# Patient Record
Sex: FEMALE | Race: WHITE
[De-identification: names, ages, dates, MRNs, and addresses within clinical notes are randomized per-mention and may not be internally consistent; named-entity substitution may affect disease eponyms.]

---

## 2018-01-06 ENCOUNTER — HOSPITAL ENCOUNTER (EMERGENCY)
Dept: HOSPITAL 57 - BURERS | Age: 33
Discharge: HOME | End: 2018-01-06
Payer: COMMERCIAL

## 2018-01-06 DIAGNOSIS — O99.612: Primary | ICD-10-CM

## 2018-01-06 DIAGNOSIS — Z3A.27: ICD-10-CM

## 2018-01-06 DIAGNOSIS — F17.210: ICD-10-CM

## 2018-01-06 DIAGNOSIS — O16.2: ICD-10-CM

## 2018-01-06 DIAGNOSIS — F41.9: ICD-10-CM

## 2018-01-06 DIAGNOSIS — F32.9: ICD-10-CM

## 2018-01-06 DIAGNOSIS — A08.4: ICD-10-CM

## 2018-01-06 DIAGNOSIS — O99.332: ICD-10-CM

## 2018-01-06 DIAGNOSIS — O99.342: ICD-10-CM

## 2018-01-06 LAB
ALBUMIN SERPL BCG-MCNC: 3.6 G/DL (ref 3.5–5)
ALP SERPL-CCNC: 137 U/L (ref 40–150)
ALT SERPL W P-5'-P-CCNC: 7 U/L (ref 8–55)
ANION GAP SERPL CALC-SCNC: 17 MMOL/L (ref 10–20)
AST SERPL-CCNC: 14 U/L (ref 5–34)
BACTERIA UR QL AUTO: (no result) HPF
BASOPHILS # BLD AUTO: 0 THOU/UL (ref 0–0.2)
BASOPHILS NFR BLD AUTO: 0.3 % (ref 0–1)
BILIRUB SERPL-MCNC: 0.4 MG/DL (ref 0.2–1.2)
BUN SERPL-MCNC: 9 MG/DL (ref 7–18.7)
CALCIUM SERPL-MCNC: 8.5 MG/DL (ref 7.8–10.44)
CHLORIDE SERPL-SCNC: 105 MMOL/L (ref 98–107)
CO2 SERPL-SCNC: 20 MMOL/L (ref 22–29)
CREAT CL PREDICTED SERPL C-G-VRATE: 0 ML/MIN (ref 70–130)
EOSINOPHIL # BLD AUTO: 0 THOU/UL (ref 0–0.7)
EOSINOPHIL NFR BLD AUTO: 0.2 % (ref 0–10)
GLOBULIN SER CALC-MCNC: 3.2 G/DL (ref 2.4–3.5)
GLUCOSE SERPL-MCNC: 119 MG/DL (ref 70–105)
HGB BLD-MCNC: 11.5 G/DL (ref 12–16)
LIPASE SERPL-CCNC: 18 U/L (ref 8–78)
LYMPHOCYTES # BLD AUTO: 0.8 THOU/UL (ref 1.2–3.4)
LYMPHOCYTES NFR BLD AUTO: 5.7 % (ref 21–51)
MCH RBC QN AUTO: 30.7 PG (ref 27–31)
MCV RBC AUTO: 90.6 FL (ref 81–99)
MONOCYTES # BLD AUTO: 0.5 THOU/UL (ref 0.11–0.59)
MONOCYTES NFR BLD AUTO: 3.5 % (ref 0–10)
NEUTROPHILS # BLD AUTO: 13.1 THOU/UL (ref 1.4–6.5)
NEUTROPHILS NFR BLD AUTO: 90.4 % (ref 42–75)
PLATELET # BLD AUTO: 319 THOU/UL (ref 130–400)
POTASSIUM SERPL-SCNC: 3.8 MMOL/L (ref 3.5–5.1)
PROT UR STRIP.AUTO-MCNC: 30 MG/DL
RBC # BLD AUTO: 3.75 MILL/UL (ref 4.2–5.4)
RBC UR QL AUTO: (no result) HPF (ref 0–3)
SODIUM SERPL-SCNC: 138 MMOL/L (ref 136–145)
SP GR UR STRIP: 1.02 (ref 1–1.03)
WBC # BLD AUTO: 14.5 THOU/UL (ref 4.8–10.8)
WBC UR QL AUTO: (no result) HPF (ref 0–3)

## 2018-01-06 PROCEDURE — 96361 HYDRATE IV INFUSION ADD-ON: CPT

## 2018-01-06 PROCEDURE — 80053 COMPREHEN METABOLIC PANEL: CPT

## 2018-01-06 PROCEDURE — 83690 ASSAY OF LIPASE: CPT

## 2018-01-06 PROCEDURE — 81015 MICROSCOPIC EXAM OF URINE: CPT

## 2018-01-06 PROCEDURE — 96374 THER/PROPH/DIAG INJ IV PUSH: CPT

## 2018-01-06 PROCEDURE — 81003 URINALYSIS AUTO W/O SCOPE: CPT

## 2018-01-06 PROCEDURE — 85025 COMPLETE CBC W/AUTO DIFF WBC: CPT

## 2018-01-06 PROCEDURE — 94760 N-INVAS EAR/PLS OXIMETRY 1: CPT

## 2018-04-07 ENCOUNTER — HOSPITAL ENCOUNTER (INPATIENT)
Dept: HOSPITAL 92 - ERS | Age: 33
LOS: 3 days | Discharge: HOME | DRG: 776 | End: 2018-04-10
Attending: INTERNAL MEDICINE | Admitting: INTERNAL MEDICINE
Payer: COMMERCIAL

## 2018-04-07 VITALS — BODY MASS INDEX: 21.7 KG/M2

## 2018-04-07 DIAGNOSIS — O99.89: ICD-10-CM

## 2018-04-07 DIAGNOSIS — F41.8: ICD-10-CM

## 2018-04-07 DIAGNOSIS — Z82.49: ICD-10-CM

## 2018-04-07 DIAGNOSIS — E87.6: ICD-10-CM

## 2018-04-07 DIAGNOSIS — I50.21: ICD-10-CM

## 2018-04-07 DIAGNOSIS — Z79.899: ICD-10-CM

## 2018-04-07 DIAGNOSIS — I24.8: ICD-10-CM

## 2018-04-07 DIAGNOSIS — F17.210: ICD-10-CM

## 2018-04-07 DIAGNOSIS — D64.9: ICD-10-CM

## 2018-04-07 DIAGNOSIS — I34.1: ICD-10-CM

## 2018-04-07 LAB
ALBUMIN SERPL BCG-MCNC: 3.7 G/DL (ref 3.5–5)
ALP SERPL-CCNC: 108 U/L (ref 40–150)
ALT SERPL W P-5'-P-CCNC: 10 U/L (ref 8–55)
ANION GAP SERPL CALC-SCNC: 13 MMOL/L (ref 10–20)
AST SERPL-CCNC: 18 U/L (ref 5–34)
BASOPHILS # BLD AUTO: 0.1 THOU/UL (ref 0–0.2)
BASOPHILS NFR BLD AUTO: 0.6 % (ref 0–1)
BILIRUB SERPL-MCNC: 0.5 MG/DL (ref 0.2–1.2)
BUN SERPL-MCNC: 15 MG/DL (ref 7–18.7)
CALCIUM SERPL-MCNC: 8.5 MG/DL (ref 7.8–10.44)
CHLORIDE SERPL-SCNC: 107 MMOL/L (ref 98–107)
CK MB SERPL-MCNC: 2.2 NG/ML (ref 0–6.6)
CO2 SERPL-SCNC: 20 MMOL/L (ref 22–29)
CREAT CL PREDICTED SERPL C-G-VRATE: 0 ML/MIN (ref 70–130)
CRYSTAL-AUWI FLAG: 0.1 (ref 0–15)
EOSINOPHIL # BLD AUTO: 0 THOU/UL (ref 0–0.7)
EOSINOPHIL NFR BLD AUTO: 0.2 % (ref 0–10)
GLOBULIN SER CALC-MCNC: 3 G/DL (ref 2.4–3.5)
GLUCOSE SERPL-MCNC: 106 MG/DL (ref 70–105)
HEV IGM SER QL: 1.3 (ref 0–7.99)
HGB BLD-MCNC: 11 G/DL (ref 12–16)
HYALINE CASTS #/AREA URNS LPF: (no result) LPF
LYMPHOCYTES # BLD: 2.1 THOU/UL (ref 1.2–3.4)
LYMPHOCYTES NFR BLD AUTO: 25.1 % (ref 21–51)
MCH RBC QN AUTO: 30.9 PG (ref 27–31)
MCV RBC AUTO: 89.5 FL (ref 81–99)
MONOCYTES # BLD AUTO: 0.5 THOU/UL (ref 0.11–0.59)
MONOCYTES NFR BLD AUTO: 5.6 % (ref 0–10)
NEUTROPHILS # BLD AUTO: 5.8 THOU/UL (ref 1.4–6.5)
NEUTROPHILS NFR BLD AUTO: 68.6 % (ref 42–75)
PATHC CAST-AUWI FLAG: 0 (ref 0–2.49)
PLATELET # BLD AUTO: 346 THOU/UL (ref 130–400)
POTASSIUM SERPL-SCNC: 3.2 MMOL/L (ref 3.5–5.1)
PREGS CONTROL BACKGROUND?: (no result)
PREGS CONTROL BAR APPEAR?: YES
RBC # BLD AUTO: 3.57 MILL/UL (ref 4.2–5.4)
RBC UR QL AUTO: (no result) HPF (ref 0–3)
SODIUM SERPL-SCNC: 137 MMOL/L (ref 136–145)
SP GR UR STRIP: 1.05 (ref 1–1.04)
SPERM-AUWI FLAG: 0 (ref 0–9.9)
TROPONIN I SERPL DL<=0.01 NG/ML-MCNC: 0.03 NG/ML (ref ?–0.03)
TROPONIN I SERPL DL<=0.01 NG/ML-MCNC: 0.04 NG/ML (ref ?–0.03)
TROPONIN I SERPL DL<=0.01 NG/ML-MCNC: 0.04 NG/ML (ref ?–0.03)
WBC # BLD AUTO: 8.4 THOU/UL (ref 4.8–10.8)
WBC UR QL AUTO: (no result) HPF (ref 0–3)
YEAST-AUWI FLAG: 0 (ref 0–25)

## 2018-04-07 PROCEDURE — 82553 CREATINE MB FRACTION: CPT

## 2018-04-07 PROCEDURE — 36415 COLL VENOUS BLD VENIPUNCTURE: CPT

## 2018-04-07 PROCEDURE — A4216 STERILE WATER/SALINE, 10 ML: HCPCS

## 2018-04-07 PROCEDURE — 87040 BLOOD CULTURE FOR BACTERIA: CPT

## 2018-04-07 PROCEDURE — 81003 URINALYSIS AUTO W/O SCOPE: CPT

## 2018-04-07 PROCEDURE — 84550 ASSAY OF BLOOD/URIC ACID: CPT

## 2018-04-07 PROCEDURE — 85379 FIBRIN DEGRADATION QUANT: CPT

## 2018-04-07 PROCEDURE — 93306 TTE W/DOPPLER COMPLETE: CPT

## 2018-04-07 PROCEDURE — 71275 CT ANGIOGRAPHY CHEST: CPT

## 2018-04-07 PROCEDURE — 96372 THER/PROPH/DIAG INJ SC/IM: CPT

## 2018-04-07 PROCEDURE — 86140 C-REACTIVE PROTEIN: CPT

## 2018-04-07 PROCEDURE — 96361 HYDRATE IV INFUSION ADD-ON: CPT

## 2018-04-07 PROCEDURE — 93798 PHYS/QHP OP CAR RHAB W/ECG: CPT

## 2018-04-07 PROCEDURE — 84703 CHORIONIC GONADOTROPIN ASSAY: CPT

## 2018-04-07 PROCEDURE — 83735 ASSAY OF MAGNESIUM: CPT

## 2018-04-07 PROCEDURE — 93005 ELECTROCARDIOGRAM TRACING: CPT

## 2018-04-07 PROCEDURE — 71045 X-RAY EXAM CHEST 1 VIEW: CPT

## 2018-04-07 PROCEDURE — 84484 ASSAY OF TROPONIN QUANT: CPT

## 2018-04-07 PROCEDURE — 85652 RBC SED RATE AUTOMATED: CPT

## 2018-04-07 PROCEDURE — 96365 THER/PROPH/DIAG IV INF INIT: CPT

## 2018-04-07 PROCEDURE — 83880 ASSAY OF NATRIURETIC PEPTIDE: CPT

## 2018-04-07 PROCEDURE — 80053 COMPREHEN METABOLIC PANEL: CPT

## 2018-04-07 PROCEDURE — 96367 TX/PROPH/DG ADDL SEQ IV INF: CPT

## 2018-04-07 PROCEDURE — 96376 TX/PRO/DX INJ SAME DRUG ADON: CPT

## 2018-04-07 PROCEDURE — 81015 MICROSCOPIC EXAM OF URINE: CPT

## 2018-04-07 PROCEDURE — 85025 COMPLETE CBC W/AUTO DIFF WBC: CPT

## 2018-04-07 NOTE — HP
PRIMARY CARE PHYSICIAN:  City call admission.

 

REASON FOR ADMISSION:  Increasing shortness of breath, hypoxia, tachycardia (new onset postpartum car
diomyopathy, suspected pneumonia).

 

HISTORY OF PRESENT ILLNESS:  A 32-year-old female, who has past medical history of depression.  She h
ad normal vaginal delivery on 03/19/2018.  Subsequently, the patient started feeling increasing short
ness of breath.  She was getting out of breath after walking a few distances.  She started also feeli
ng orthopnea and she was waking up with shortness of breath for the last 2 to 3 days.  Her condition 
was kept getting worse.  She was having cough productive of scant amount of sputum.  She denies any l
ower extremity edema.  She denies any calf tenderness.  She denies any chest pain or pleurisy.  She d
enies any fever or chills.  She denies any flu-like illness.  She denies any sick exposure or recent 
travel.

 

She denies any syncopal episode.  She denies any UTI symptoms.  She denies any constipation, diarrhea
, melena or hematochezia.

 

In the emergency room, this patient was tachycardic and tachypneic as well as mildly hypoxic.  She wa
s hypertensive.  She had a chest x-ray in the emergency room, which showed pulmonary vascular congest
ion and multifocal patchy infiltration.  CT angio was performed, which showed no evidence of pulmonar
y embolism, but patchy ground glass opacity throughout the lung parenchyma.  The patient was given va
ncomycin and Zosyn in the emergency room for suspected pneumonia.  The patient had potassium low and 
potassium chloride was also given orally and she was given some IV fluid.  When I saw this patient, a
t that time, the patient was having tachycardia.  She was maintaining saturation with nasal cannula a
nd she was vitals wise stable.  Her BNP is also elevated.  She is afebrile.  At that point, we decide
d to change her status from IMCU admission to telemetry floor.

 

ALLERGIES:  No known drug allergy.

 

CURRENT HOME MEDICATIONS:  Prenatal vitamin 1 tablet p.o. daily, Zoloft 25 mg p.o. daily, Motrin 800 
mg q.8 hourly. p.r.n., and Tylenol #3 one tablet q.4 hourly p.r.n.

 

REVIEW OF SYSTEMS:  The following complete review of systems was negative, unless otherwise mentioned
 in the HPI or below:

Constitutional:  Weight loss or gain, ability to conduct usual activities.

Skin:  Rash, itching.

Eyes:  Double vision, pain.

ENT/Mouth:  Nose bleeding, neck stiffness, pain, tenderness.

Cardiovascular:  Palpitations, dyspnea on exertion, orthopnea.

Respiratory:  Shortness of breath, wheezing, cough, hemoptysis, fever or night sweats.

Gastrointestinal:  Poor appetite, abdominal pain, heartburn, nausea, vomiting, constipation, or diarr
hea.

Genitourinary:  Urgency, frequency, dysuria, nocturia.

Musculoskeletal:  Pain, swelling.

Neurologic/Psychiatric:  Anxiety, depression.

Allergy/Immunologic:  Skin rash, bleeding tendency.

 

Please see my HPI for pertinent positive and negative.  All other review of systems reviewed and nega
tive except as mentioned in the HPI.

 

PAST MEDICAL HISTORY:  Mitral valve prolapse, hypertension and borderline diabetes during pregnancy.

 

PAST SURGICAL HISTORY:  Reviewed and negative.

 

PAST PSYCHIATRIC HISTORY:  Anxiety and depression.

 

SOCIAL HISTORY:  The patient is .  She lives at home with her .  She smokes about half 
pack per day.  She denies any alcohol or other illicit drug abuse.

 

OBSTETRICAL/GYNECOLOGICAL HISTORY:  The patient had total five pregnancies.  She has total of 3 kids 
alive and 2 miscarriages.

 

FAMILY HISTORY:  No strong family history of premature coronary artery disease, stroke or cancer.

 

EMERGENCY ROOM COURSE:  The patient has received vancomycin, Zosyn, IV fluid, and potassium chloride.


 

PHYSICAL EXAMINATION:

VITAL SIGNS:  On arrival, blood pressure 120/104, pulse 118, respiratory rate 24, temperature 99.6, s
aturation 95% on 3 liter oxygen.  Weight 58 kilograms.

GENERAL:  The patient is currently alert, awake, tachypneic, tachycardic, hypertensive, no obvious ac
[place] distress.

HEENT:  Head:  Normocephalic, atraumatic.  Eyes:  Pupils round, reactive to light.  Extraocular muscl
es are intact.  ENT:  Oropharynx within normal limits.  Moist mucous membranes.  No oral lesion, no p
haryngeal erythema, no exudate.

NECK:  Supple, no JVD, no thyromegaly, no carotid bruit, no jugular venous distention.

LUNGS:  Bibasilar rales noted, coarse breath sounds.  No wheezing, no rhonchi.

CARDIAC:  S1, S2 regular, tachycardia, soft systolic murmur present.

ABDOMEN:  Soft, bowel sounds present, nontender, nondistended.  No organomegaly, no mass, no suprapub
ic tenderness.

BACK:  Unremarkable, no CVA tenderness.

EXTREMITIES:  Upper extremity:  Passive movements of all joints are normal.  Lower extremities:  No e
мария, no calf tenderness.  Good distal pulsation.

SKIN:  No skin rash.

HEMATOLOGIC:  No lymphadenopathy.

PSYCHIATRIC:  Normal affect.

 

SIGNIFICANT LABS:  EKG showing sinus tachycardia, nonspecific ST-T changes in inferolateral lead.  Ch
est x-ray showing pulmonary vascular congestion, multifocal infiltration.  CT angio showing no eviden
ce of PE, ground glass opacity throughout the lung parenchyma.

 

CBC:  WBC 8.4, hemoglobin 11.0, platelets 346, ESR is 41.  D-dimer is 0.86.

 

BMP:  Sodium 137, potassium 3.2, chloride 107, carbon dioxide 20, BUN 15, creatinine 0.82, glucose 10
6, calcium 8.5.

 

LFT:  AST 18, ALT 10, alkaline phosphatase 108, albumin 3.7, CK-MB 2.2.  Troponin I is 0.038, then 0.
040.  CRP is 2.35.  BNP is 1642, magnesium 1.7.  Urinalysis is unremarkable.

 

ASSESSMENT AND PLAN:

1.  New onset postpartum cardiomyopathy.  At this point, this patient has a classic history of dyspne
a on exertion, orthopnea, paroxysmal nocturnal dyspnea, as well as elevated BNP.  The patient's chest
 x-ray also showing pulmonary vascular congestion and CT chest also supports pulmonary vascular conge
stion.  She has elevated BNP and based on appropriate clinical scenario, we are suspecting postpartum
 cardiomyopathy.  We will obtain echocardiography to assess ejection fraction and other structural ab
normality.  This patient will be treated with Lasix 20 mg IV b.i.d.  We will monitor weight, renal fu
nction and electrolytes, and replace accordingly.  Depending upon echocardiographic finding, we will 
consider Cardiology evaluation.  We will monitor on telemetry floor.

2.  Elevated troponin, likely due to demand ischemia.  We will do total 3 sets of cardiac enzymes.  W
e will continue aspirin 81 mg p.o. daily.  Echocardiography will be done.  We will check lipid profil
e tomorrow morning for risk stratification.

3.  Hypokalemia.  Magnesium checked and it is normal.  We will replace potassium chloride 20 mEq p.o.
 daily and replace more if needed.

4.  Anemia, normocytic, normochromic.  We will continue prenatal vitamin daily while in the hospital.


5.  Anxiety and depression.  Continue Zoloft 50 mg p.o. daily.

6.  Elevated D-dimer, but negative CT angio.

7.  Suspected pneumonia.  We will continue Zosyn 3.375 grams IV q.6 hourly.

8.  Deep venous thrombosis prophylaxis, Lovenox 40 mg subcu daily.

9.  Gastrointestinal prophylaxis, Pepcid 20 mg p.o. b.i.d.

9.  Code status:  The patient is FULL CODE.  The patient's  is surrogate decision maker.

10.  Tobacco abuse disorder.  Smoking cessation counseling given.  We will offer nicotine patch if ne
eded.

 

Disposition plan based on clinical course.  We are expecting the patient's stay in hospital more than
 2 midnights.  Plan of care discussed with the patient in detail.

## 2018-04-07 NOTE — RAD
CHEST 1 VIEW:

 

Date:  04/07/18

 

HISTORY:  

Chest pain. 

 

FINDINGS:

Cardiac silhouette is magnified and upper limits of normal in size. Pulmonary vasculature is engorged
 with multifocal ill-defined patchy infiltrates throughout each lung, most pronounced at the right guilherme
ng base. Small amount of right pleural fluid is apparent. Mediastinum is midline. No evidence of pneu
mothorax. 

 

IMPRESSION: 

Borderline pulmonary vascular congestion with multifocal patchy infiltrates. Please see separate repo
rt regarding CT chest performed on the same date. 

 

 

POS: [facility]

## 2018-04-07 NOTE — PDOC.CTH
Cardiology Progress Note





- Objective


 Vital Signs











  Temp Pulse Resp BP Pulse Ox


 


 04/07/18 16:36  98.6 F  75  16  130/91 H  94 L


 


 04/07/18 12:16  97.8 F  61  20   96


 


 04/07/18 12:00  97.8 F  122 H  20  129/87  96








 











Weight                         114 lb 10.246 oz














 











 04/06/18 04/07/18 04/08/18





 06:59 06:59 06:59


 


Intake Total   680


 


Output Total   1550


 


Balance   -870














- Labs


Result Diagrams: 


 04/07/18 04:44





 04/07/18 04:44


 Troponin/CKMB











CK-MB (CK-2)  2.2 ng/mL (0-6.6)   04/07/18  04:44    


 


Troponin I  0.029 ng/mL (< 0.028)  H  04/07/18  12:47    














- Assessment/Plan





<Progress note>


Echo result showed EF 10-15%.  Lasix was increased to 20mg IV TID.  Also Lasix 

20mg IV and Kcl 20mEq x 1 now. Plan to start BBlocker and ACE when the pt 

receive baby formula to feed her new-born baby.

## 2018-04-07 NOTE — CT
PRELIMINARY REPORT/[facility]/EMERGENTY AFTER-HOURS PROCEDURE 

 

CT Angiography Chest With Intravenous Contrast

 

CLINICAL HISTORY:

32 years old, female; Pain; Chest pain; Patient HX: Pt reports chest pain x 1.5 weeks. Worse tonight 
with inspiration. Pt reports "when i laid down i felt like i was going to drown. " no history of card
iac problems. Pt is 3 weeks post partum. Pt reports subjective fever.

 

TECHNIQUE:

Axial computed tomographic angiography images of the chest with intravenous contrast using pulmonary 
embolism protocol. MIP reconstructed images were created and reviewed. Oblique reformatted images wer
e created and reviewed.

 

COMPARISON:

No relevant prior studies available.

 

FINDINGS:

Pulmonary arteries: No pulmonary embolism.

Aorta: Unremarkable.

Inferior vena cava: Reflux of contrast in the IVC and hepatic veins.

Lungs: Right greater than left patchy groundglass opacities throughout the lung parenchyma.

Pleural space: Small bilateral pleural effusions. No pneumothorax.

Heart: Unremarkable.

Bones/joints: No acute fracture. No dislocation.

Soft tissues: Unremarkable.

Lymph nodes: No enlarged lymph nodes.

 

IMPRESSION:

1. No evidence of pulmonary thromboembolism.

2. Right greater than left patchy ground glass opacities throughout the lung parenchyma. The differen
tial diagnosis for ground glass opacity is extensive and includes pulmonary edema and hemorrhage, int
erstitial pneumonias (UIP, DIP, LIP, and acute), hypersensitivity pneumonia, atypical

infectious pneumonias such as Pneumocystis carinii, mycoplasma, or CMV pneumonia, and cryptogenic org
anizing pneumonia. Correlate clinically.

3. Small bilateral pleural effusions.

 

Thank you for allowing us to participate in the care of your patient.

Dictated and Authenticated by: Alex Phillips MD

04/07/2018 5:10 AM Central Time ([place])

 

 

FINAL REPORT 

EMERGENCY AFTER HOURS CT ARTERIOGRAM CHEST WITH IV CONTRAST AND 3D MIP IMAGING:

 

Date:  04/07/18 

Time:  0452 hours

 

 

HISTORY:  

Chest pain. Dyspnea. 

 

FINDINGS:

Findings agree with the preliminary report by Sánchez. There is no CT evidence of pulmonary embolus. Mul
tifocal pneumonitis is confirmed. Small bilateral pleural effusions. 

 

 

 

POS: [facility]

## 2018-04-08 LAB
ALBUMIN SERPL BCG-MCNC: 3.5 G/DL (ref 3.5–5)
ALP SERPL-CCNC: 97 U/L (ref 40–150)
ALT SERPL W P-5'-P-CCNC: 9 U/L (ref 8–55)
ANION GAP SERPL CALC-SCNC: 11 MMOL/L (ref 10–20)
AST SERPL-CCNC: 12 U/L (ref 5–34)
BASOPHILS # BLD AUTO: 0 THOU/UL (ref 0–0.2)
BASOPHILS NFR BLD AUTO: 0.7 % (ref 0–1)
BILIRUB SERPL-MCNC: 0.5 MG/DL (ref 0.2–1.2)
BUN SERPL-MCNC: 13 MG/DL (ref 7–18.7)
CALCIUM SERPL-MCNC: 8.4 MG/DL (ref 7.8–10.44)
CHLORIDE SERPL-SCNC: 106 MMOL/L (ref 98–107)
CO2 SERPL-SCNC: 23 MMOL/L (ref 22–29)
CREAT CL PREDICTED SERPL C-G-VRATE: 74 ML/MIN (ref 70–130)
EOSINOPHIL # BLD AUTO: 0 THOU/UL (ref 0–0.7)
EOSINOPHIL NFR BLD AUTO: 0.7 % (ref 0–10)
GLOBULIN SER CALC-MCNC: 2.8 G/DL (ref 2.4–3.5)
GLUCOSE SERPL-MCNC: 102 MG/DL (ref 70–105)
HGB BLD-MCNC: 10.7 G/DL (ref 12–16)
LYMPHOCYTES # BLD: 2.5 THOU/UL (ref 1.2–3.4)
LYMPHOCYTES NFR BLD AUTO: 37.8 % (ref 21–51)
MAGNESIUM SERPL-MCNC: 1.8 MG/DL (ref 1.6–2.6)
MCH RBC QN AUTO: 30.1 PG (ref 27–31)
MCV RBC AUTO: 90.4 FL (ref 81–99)
MONOCYTES # BLD AUTO: 0.7 THOU/UL (ref 0.11–0.59)
MONOCYTES NFR BLD AUTO: 10.2 % (ref 0–10)
NEUTROPHILS # BLD AUTO: 3.3 THOU/UL (ref 1.4–6.5)
NEUTROPHILS NFR BLD AUTO: 50.5 % (ref 42–75)
PLATELET # BLD AUTO: 323 THOU/UL (ref 130–400)
POTASSIUM SERPL-SCNC: 3.4 MMOL/L (ref 3.5–5.1)
RBC # BLD AUTO: 3.56 MILL/UL (ref 4.2–5.4)
SODIUM SERPL-SCNC: 137 MMOL/L (ref 136–145)
URATE SERPL-MCNC: 3.5 MG/DL (ref 2.6–6)
WBC # BLD AUTO: 6.5 THOU/UL (ref 4.8–10.8)

## 2018-04-08 NOTE — PDOC.CTH
<Michelle Hicks - Last Filed: 04/08/18 15:05>





Cardiology Progress Note





- Subjective





The pt seen and examined.  No overnight events.  She stated she can breath 

better than yesterday.  She denied dizziness or lightheadedness, CP or 

discomfort in her chest, or other cardiac complaints. 





- Objective


 Vital Signs











  Temp Pulse Pulse Pulse Resp BP BP


 


 04/08/18 12:40  98.9 F  98    16  


 


 04/08/18 08:36    94  113 H   123/92 H  119/64


 


 04/08/18 08:07  97.9 F  106 H    16  


 


 04/08/18 03:51  97.9 F  100    18  














  BP BP Pulse Ox Pulse Ox Pulse Ox


 


 04/08/18 12:40   113/67  94 L  


 


 04/08/18 08:36     98  94 L


 


 04/08/18 08:07   110/73  96  


 


 04/08/18 03:51  113/74   97  








 











Weight                         4.144 oz














 











 04/07/18 04/08/18 04/09/18





 06:59 06:59 06:59


 


Intake Total  680 


 


Output Total  1550 


 


Balance  -870 














- Physical Examination


General/Neuro: alert & oriented x3


Neck: no JVD present


Heart: RRR


Abdomen: soft


Extremities: other: (No edema)





- Telemetry


Telemetry Rhythm: SR 90s





- Labs


Result Diagrams: 


 04/08/18 05:04





 04/08/18 05:04


 Troponin/CKMB











CK-MB (CK-2)  2.2 ng/mL (0-6.6)   04/07/18  04:44    


 


Troponin I  0.029 ng/mL (< 0.028)  H  04/07/18  12:47    














- Assessment/Plan





1. Acute on Chronic Systolic HF - improving with BBlocker, ACE, and 

Spironolactone. cont. to monitor  


2. Postpartum CMY - Echo on 04/07/18 showed EF 10-15%, dilated LV, dilated LA, 

mild-mod MR, and mild TR. 


3. Tachycardia - Remains in SR with HR 80-90s with Coreg 3.125mg BID; cont. to 

monitor on tele


4. Anemia - slightly decreased today. cont. to monitor


5. HypoKalemia - replaced by her PCP today


6. PNA - on antibiotics by PCP


7. Current smoker - Smoking cessation education given to the pt


MAR reviewed





* Since she is on ACE, she is not breast feeding to her baby.  











Review of Systems





- Review of Systems


Constitutional: reports: no symptoms reported


EENTM: reports: no symptoms reported


Respiratory: reports: no symptoms reported


Cardiac (ROS): reports: no symptoms reported


ABD/GI: reports: no symptoms reported


: reports: no symptoms reported


Musculoskeletal: reports: no symptoms reported


Skin: reports: no symptoms reported





<NAYELI Cassidy Harvey - Last Filed: 04/08/18 19:49>





Cardiology Progress Note





- Objective


 Vital Signs











  Temp Pulse Pulse Pulse Resp BP BP


 


 04/08/18 16:07  98.2 F  96    16  


 


 04/08/18 12:40  98.9 F  98    16  


 


 04/08/18 08:36    94  113 H   123/92 H  119/64


 


 04/08/18 08:07  97.9 F  106 H    16  














  BP Pulse Ox Pulse Ox Pulse Ox


 


 04/08/18 16:07  114/87  97  


 


 04/08/18 12:40  113/67  94 L  


 


 04/08/18 08:36    98  94 L


 


 04/08/18 08:07  110/73  96  








 











Weight                         4.144 oz














 











 04/07/18 04/08/18 04/09/18





 06:59 06:59 06:59


 


Intake Total  680 


 


Output Total  1550 


 


Balance  -870 














- Labs


Result Diagrams: 


 04/08/18 05:04





 04/08/18 05:04


 Troponin/CKMB











CK-MB (CK-2)  2.2 ng/mL (0-6.6)   04/07/18  04:44    


 


Troponin I  0.029 ng/mL (< 0.028)  H  04/07/18  12:47    














- Assessment/Plan





Pt. was seen and eval by me. She is feeling better and diuresing. Tolerating 

the meds thus far. She will need a Life-vest prior to discharge.Chest clear, 

RRR.


Severe CMY- presumably post-partum.


I agree with the A/P by the NP.

## 2018-04-08 NOTE — PDOC.PN
- Subjective


Encounter Start Date: 04/08/18


Encounter Start Time: 08:30


-: old records requested/rev





Patient seen and examined. No new complaints. No overnight events


less dyspnea, no fever, no chest pain





- Objective


Resuscitation Status: 


 











Resuscitation Status           FULL:Full Resuscitation














MAR Reviewed: Yes


Vital Signs & Weight: 


 Vital Signs (12 hours)











  Temp Pulse Resp BP BP Pulse Ox


 


 04/08/18 08:07  97.9 F  106 H  16   110/73  96


 


 04/08/18 03:51  97.9 F  100  18  113/74   97








 Weight











Weight                         4.144 oz














I&O: 


 











 04/07/18 04/08/18 04/09/18





 06:59 06:59 06:59


 


Intake Total  680 


 


Output Total  1550 


 


Balance  -870 











Result Diagrams: 


 04/08/18 05:04





 04/08/18 05:04


Radiology Reviewed by me: Yes (echo- low EF)


EKG Reviewed by me: Yes (sinus tachycardia)





Phys Exam





- Physical Examination


Constitutional: NAD


HEENT: PERRLA, moist MMs, sclera anicteric


Neck: no JVD, supple


Respiratory: no wheezing, no rhonchi


basal rales


Cardiovascular: RRR, no significant murmur, no rub


Gastrointestinal: soft, non-tender, no distention, positive bowel sounds


Musculoskeletal: no edema, pulses present


Neurological: non-focal, normal sensation, moves all 4 limbs


Psychiatric: normal affect, A&O x 3


Skin: no rash, normal turgor





Dx/Plan


(1) Acute systolic heart failure


Code(s): I50.21 - ACUTE SYSTOLIC (CONGESTIVE) HEART FAILURE   Status: Acute   





(2) Demand ischemia of myocardium


Code(s): I24.8 - OTHER FORMS OF ACUTE ISCHEMIC HEART DISEASE   Status: Acute   





(3) Hypokalemia


Code(s): E87.6 - HYPOKALEMIA   Status: Acute   





(4) Postpartum cardiomyopathy


Code(s): O90.3 - PERIPARTUM CARDIOMYOPATHY   Status: Acute   





(5) Anemia, normocytic normochromic


Code(s): D64.9 - ANEMIA, UNSPECIFIED   Status: Chronic   





(6) Anxiety and depression


Code(s): F41.9 - ANXIETY DISORDER, UNSPECIFIED; F32.9 - MAJOR DEPRESSIVE 

DISORDER, SINGLE EPISODE, UNSPECIFIED   Status: Chronic   





(7) Tobacco abuse


Code(s): Z72.0 - TOBACCO USE   Status: Chronic   





(8) Multifocal pneumonia


Code(s): J18.9 - PNEUMONIA, UNSPECIFIED ORGANISM   Status: Suspected   





- Plan


cont current plan of care, plan discussed w/ family, continue antibiotics





* continue lasix


* added coreg and lisinopril


* will add aldactone


* replace potassium


* repeat labs tomorrow


* cardiac rehab


* medication reviewed as below


* symptomatic treatment


* DC zosyn


* start augmentin.








Review of Systems





- Review of Systems


Constitutional: negative: fever, chills, sweats, weakness, malaise, other


ENT: negative: Ear Pain, Ear Discharge, Nose Pain, Nose Discharge, Nose 

Congestion, Mouth Pain, Mouth Swelling, Throat Pain, Throat Swelling, Other


Respiratory: Shortness of Breath, SOB with Excertion.  negative: Cough, Dry, 

Hemoptysis, Pleuritic Pain, Sputum, Wheezing


Cardiovascular: orthopnea.  negative: chest pain, palpitations, paroxysmal 

nocturnal dyspnea, edema, light headedness, other


Gastrointestinal: negative: Nausea, Vomiting, Abdominal Pain, Diarrhea, 

Constipation, Melena, Hematochezia, Other


Genitourinary: negative: Dysuria, Frequency, Incontinence, Hematuria, Retention

, Other


Musculoskeletal: negative: Neck Pain, Shoulder Pain, Arm Pain, Back Pain, Hand 

Pain, Leg Pain, Foot Pain, Other


Skin: negative: Rash, Lesions, Drew, Bruising, Other





- Medications/Allergies


Allergies/Adverse Reactions: 


 Allergies











Allergy/AdvReac Type Severity Reaction Status Date / Time


 


No Known Drug Allergies Allergy   Verified 04/07/18 09:33











Medications: 


 Current Medications





Acetaminophen (Tylenol)  650 mg PO Q4H PRN


   PRN Reason: Headache/Fever or Pain


   Last Admin: 04/07/18 18:20 Dose:  650 mg


Hydrocodone Bitart/Acetaminophen (Norco 5/325)  1 tab PO Q4H PRN


   PRN Reason: Moderate Pain (4-6)


   Last Admin: 04/07/18 23:13 Dose:  1 tab


Al Hydroxide/Mg Hydroxide (Maalox)  30 ml PO Q6H PRN


   PRN Reason: Heartburn  or Indigestion


Amoxicillin/Clavulanate Potassium (Augmentin)  875 mg PO Q12HR [facility]


   Last Admin: 04/08/18 08:13 Dose:  875 mg


Artificial Tears (Tears Naturale)  0 drop EA EYE PRN PRN


   PRN Reason: Dry Eyes


Aspirin (Aspirin Chewable)  81 mg PO DAILY [facility]


   Last Admin: 04/08/18 08:14 Dose:  81 mg


Carvedilol (Coreg)  3.125 mg PO BID-[facility]


   Last Admin: 04/08/18 08:14 Dose:  3.125 mg


Enoxaparin Sodium (Lovenox)  40 mg SC 0900 [facility]


   Last Admin: 04/08/18 08:14 Dose:  40 mg


Famotidine (Pepcid)  20 mg PO BID [facility]


   Last Admin: 04/08/18 08:14 Dose:  20 mg


Furosemide (Lasix)  20 mg SLOW IVP 0600,1400 [facility]


   Last Admin: 04/08/18 05:24 Dose:  20 mg


Guaifenesin (Robitussin Sf)  200 mg PO Q4H PRN


   PRN Reason: Cough


Labetalol HCl (Normodyne)  10 mg SLOW IVP Q4H PRN


   PRN Reason: Systolic BP > 180


Lisinopril (Zestril)  2.5 mg PO 2100 [facility]


   Last Admin: 04/07/18 20:08 Dose:  2.5 mg


Loperamide HCl (Imodium)  2 mg PO PRN PRN


   PRN Reason: Diarrhea/Loose Stools


Loratadine (Claritin)  10 mg PO DAILYPRN PRN


   PRN Reason: Sinus Symptoms


Magnesium Hydroxide (Milk Of Magnesium)  30 ml PO DAILYPRN PRN


   PRN Reason: Constipation


Magnesium Oxide (Magnesium Oxide)  400 mg PO DAILY [facility]


   Last Admin: 04/08/18 08:15 Dose:  400 mg


Mineral Oil/White Petrolatum (Eucerin Cream)  0 gm TOP BIDPRN PRN


   PRN Reason: Dry Skin


Nicotine (Nicoderm Patch)  21 mg TOP Q24HR [facility]


   Last Admin: 04/07/18 17:15 Dose:  21 mg


Ondansetron HCl (Zofran Odt)  4 mg PO Q6H PRN


   PRN Reason: Nausea/Vomiting


Ondansetron HCl (Zofran)  4 mg IVP Q6H PRN


   PRN Reason: Nausea/Vomiting


Phenol (Chloraseptic Spray 180 Ml Bot)  0 ml PO PRN PRN


   PRN Reason: Sore Throat


Potassium Chloride (K-Dur)  20 meq PO QAM-[facility]


   Last Admin: 04/08/18 08:15 Dose:  20 meq


Prenatal Multivit/Folic Acid/Iron (Prenatal Vitamin)  1 tab PO DAILY [facility]


   Last Admin: 04/08/18 08:14 Dose:  1 tab


Saccharomyces Boulardii (Florastor)  250 mg PO DAILY [facility]


   Last Admin: 04/08/18 08:15 Dose:  250 mg


Senna (Senokot)  2 tab PO HSPRN PRN


   PRN Reason: Constipation


Sertraline HCl (Zoloft)  50 mg PO DAILY [facility]


   Last Admin: 04/08/18 08:13 Dose:  50 mg


Sodium Chloride ([place]cean Nasal Spray 0.65%)  0 ml EA NARE QIDPRN PRN


   PRN Reason: Nasal Congestion


Zolpidem Tartrate (Ambien)  5 mg PO HSPRN PRN


   PRN Reason: Insomnia

## 2018-04-09 RX ADMIN — Medication SCH ML: at 20:50

## 2018-04-09 NOTE — CON
DATE OF CONSULTATION:  04/07/2018

 

ROOM NO:  [id number]

 

She does not have a primary care doctor at this moment.  Thus, the patient's primary cardiologist is 
going to be Dr. Katina Cassidy.

 

REFERRING: Dr. Padgett.

 

No PCP provider at this moment.

 

REASON FOR CARDIOLOGY CONSULTATION:  Tachycardia and chest pain.

 

HISTORY OF PRESENT ILLNESS:  Ms. Ordoñez is a 32-year-old  female with a past medical history o
f anxiety and depression and diabetes, hypertension during pregnancy and a recent vaginal delivery on
 03/19/2018.  After the patient's normal vaginal delivery on the 03/19/2018, she started having achin
ess pain below the breasts, 4-7/10 on the pain scale, and it is hard to take a deep breath and also s
he started having tightness in her epigastric area and eventually she could not lay on the bed to sle
ep.  So she needs at least a 4 pillows to elevate her head to sleep at night.  She planned to go to s
ee the primary care doctor.  However, the patient's symptoms become worsened last night, but she deci
ded to present to the [facility] for further evaluation and treatment.  She brendon
es numbness to the left upper extremity, nausea, or vomiting.  During the episode, she received the L
asix 20 mg IV push when she was transferred to the telemetry floor and she void at least 1 liters.  S
he states she fell slightly improved shortness of breath; however, she still continued having dyspnea
 with exertion.  She does not have any significant cardiac history except that hypertension during th
e pregnancy and the patient has a CT angio to the chest due to the elevated D-dimer, which show no ev
idence of pulmonary embolus, but patchy ground glass opacities throughout the lungs, which indicated 
possible pneumonia.

 

PAST MEDICAL HISTORY:  Positive for anxiety and depression and sometimes she had a hallucination and 
mitral valve prolapse, which was diagnosed with 1 at the age of 19 and the borderline diabetes and hy
pertension during the pregnancy.

 

PAST SURGICAL HISTORY:  None.

 

FAMILY HISTORY:  Patient's mother had myocardial infarction at the age of 50.

 

SOCIAL HISTORY:  She is , however, she is living at _____ at this moment.  She smokes half pac
k a cigarette a day.  She denies any alcohol or illicit drug abuse.

 

ALLERGIES:  No known drug allergies.

 

HOME MEDICATIONS:

1.  Prenatal vitamin 1 tablet daily.

2.  Zoloft 25 mg once a day.

3.  Motrin 800 mg every 8 hours as needed for postpartum vaginal pain.  However, she says she have hi
m take the medicine for a little while and Tylenol #3 one tablet every 4 hours as needed for postpart
um pain.  Again, she says she has been taking this medicine for a little while either.

 

REVIEW OF SYSTEMS:  The following complete review of systems was negative, unless otherwise mentioned
 in the HPI or below.  Constitutional:  No weight loss or gain, sense of well being, ability to condu
ct usual activities, exercise tolerance.  Skin:  Rash, itching, change in hair growth.  Nail changes,
 breast lumps, tenderness, swelling, nipple discharge.  Eyes:  Vision change, double vision, tearing,
 blind spot pain.  ENT:  Headache, vertigo, nasal bleeding, cold, obstruction, discharge dental diffi
culty gingival bleeding, denture, neck stiffness, pain, tenderness, mass in the thyroid or other area
s.  Cardiovascular:  Precordial pain, substernal distress, palpitations, edema in the lower extremiti
es.  Heart murmur varicosis claudication.  Respiratory:  Positive for dyspnea on exertion, but negati
ve for wheezing, stridor, hemoptysis.  Gastrointestinal:  Poor appetite, dysphagia, indigestion, abdo
layne pain, palpitation, nausea, vomiting, abnormal stool or blood in the stool.  Genitourinary:  Urg
ency, frequency, dysuria, nocturia, hematuria, polyuria, oliguria, unusual color urine.  Musculoskele
kendall:  Pain, swelling, redness or heat of muscle and joint limit of motion, muscular weakness.  Neurol
ogic:  Seizure per conversion paralyzed, tremor, incoordination.  Psychiatric:  Positive for anxiety,
 depression.   Stable at this moment.

 

PHYSICAL EXAMINATION:

VITAL SIGNS:  Blood pressure 130/91, pulse is 120 with a sinus tachycardia, respiratory rate 16, O2 s
at 94% with room air, temperature 98.6.

GENERAL:  Well-developed, well-nourished without any acute distress.

HEAD:  Normocephalic, atraumatic.  Eyes:  Extraocular muscle movement intact.

Oral and nasal mucosa and normal without lesion.

NECK:  No JVD.  Neck is supple, normal range of motion.

LUNGS:  Clear to auscultation bilaterally, but diminished at the bases.  No wheezing, rales or rhonch
i noted.

CARDIOVASCULAR:  Regular rate and rhythm, normal S1, S2.  There are no S3 or S4, no significant murmu
r, hives thrill bruits noted.  2+ pulses in bilateral dorsal pedis, posterior tibial, popliteal.  Car
otid pulse present without bruit or thrill.  No edema in bilateral lower extremities.

ABDOMEN:  Soft, nontender or mass to palpate, nondistended.  Bowel sounds are present.

MUSCULOSKELETAL:  Able to move all  extremities.

SKIN:  Warm, dry.  No skin rash or lesion or bruise noted.

NEUROLOGIC:  Alert, oriented x4, awake, normal affect.  Nonfocal.

PSYCHIATRIC:  Mood, affect normal.

 

IMAGING:  A 12 leads EKGs, shows sinus tachycardia with heart rate of 115 with T-wave inversion in V1
, V2, V3, and aFb and V4, V5, V6, and the left ventricular hypertrophy.

 

LABORATORY DATA:  WBC 8.4, hemoglobin 11.0, hematocrit 31.2, platelets 3.6.  D-dimer 0.86.  Sodium 13
7, potassium 3.2, which was covered at the ER, BUN 15, creatinine 0.82, AST 18, ALT 10, CK-MB 2.2, tr
oponin is 0.038, 0.040, and 0.029.  CRP the 2.35 and BNP 1642.  CT angio of the chest showed no evide
nce of pulmonary thrombosis.  PE and small bilateral pleural effusion.

 

IMPRESSION AND PLAN:

1.  New onset postpartum cardiomyopathy.  The patient's echocardiogram result is pending.  At this mo
ment; however, due to the patient's symptoms such as dyspnea on exertion , nocturnal dyspnea, orthopn
ea, and also increasing elevated BNP level and cough.  She stepped back to the cardiomyopathy.  She s
lightly response to Lasix 20 IV.  We might like to increase the Lasix.  We might like to adjust the L
asix dosage.

2.  Indeterminate troponin level.  Patient's troponin increased possibly due to chronic cough and the
 possible from ischemia reaction to cardiomyopathy.  We like to continue to monitor on the telemetry.


3.  Elevated D-dimer.  At the patient's CT scan shows negative for PE.  She denies any pain in the lo
wer extremities with walking and No edema or swelling in the heart extremities at this moment.  We wo
uld like to continue to monitor.

4.  Possible pneumonia.  The patient on some IV antibiotic, which is managed by primary care doctor.

5.  Current smoker, smoking cessation education given to the patient and her .  Once, patient'
s echocardiogram is revealed we like to adjust the patient medication as appropriate.

 

I thank you very much for allowing the Cardiology service to participate in the care of the patient. 
 We will follow along with the patient care team and make further recommendations as appropriate.

## 2018-04-09 NOTE — ADD-CON
ADDENDUM

 

DATE OF CONSULTATION 04/07/2018

 

INDICATION FOR CONSULTATION:  New onset congestive heart failure in 32-year-old patient postpartum.  
Please refer the notes already dictated by the nurse practitioner.

 

My assessment of this 32-year-old female who has recent vaginal delivery on 03/19/2018 started develo
ping congestive heart failure symptoms.  She has had some lower extremity edema.  She presented to em
ergency room and was admitted.  EKG did show congestion.  Echocardiogram performed today shows ejecti
on fraction of 10-15% with dilated left ventricle.  She has had no previous cardiac history.  She has
 had 5 pregnancies and 3 deliveries and has not had any previous episodes of congestive heart failure
 associated with this, but today there was obviously congestive heart failure symptoms on echocardiog
ludy confirms of what appears to be postpartum cardiomyopathy.  She has had no history of pulmonary em
boli and also the CT scan did not show any evidence of pulmonary embolus.  She does have evidence of 
congestion on the chest x-ray.  There was some question as to whether or not she may have some pneumo
karol, but I suspect this is all just congestive heart failure associated with her cardiomyopathy.  She
 also was having some tachycardia.  Her EKG shows a sinus rhythm to sinus tachycardia with T-wave inv
ersions compatible with left ventricular hypertrophy.  Cardiac enzymes are slightly elevated, but are
 indeterminate, most likely associated with the congestive heart failure.

 

For her past medical history, social history, family history, review of systems, refer to the notes d
ictated.

 

PHYSICAL EXAMINATION:

GENERAL:  Reveals a young, thin female.  She is alert and oriented.

VITAL SIGNS:  Show blood pressure to be 126/90, heart rates in the one teens to 130 range, respirator
y rate is 18, O2 saturation 96% on room air.  She had temperature is 99.9.

HEENT:  Unremarkable.  Carotid pulses are present without bruits.

CHEST:  Clear at this time, but I do not hear any significant rales, rhonchi or wheezing.

CARDIOVASCULAR:  Exam reveals a tachycardia with S3 at times and is not always present though, but sh
e is tachycardic.  She is tachycardic all the time, but did not always hear the S3 and did not hear a
ny significant murmurs, heaves, thrills, bruits or rubs.

ABDOMEN:  Soft and nontender.

EXTREMITIES:  Show no clubbing or cyanosis at this time.  She had no significant edema.  She had mini
mal edema around the ankles.

NEUROLOGIC:  She appears to be intact.

SKIN:  Warm and dry.

 

Her EKG as noted above shows evidence of probable left ventricular hypertrophy with T-wave inversions
, but no acute ST segment changes that would indicate ischemia.

 

LABORATORY DATA:  Showed a BNP of 1642 with the cardiac enzymes of 0.038 increase up to 0.04 and then
 back down to 0.029; her creatinine is 0.82, potassium is 3.2.  Her D-dimer was elevated at 0.86.  He
moglobin is 11.0.

 

IMPRESSION AND PLAN:

1.  Postpartum cardiomyopathy.  She will need to undergo diuresis with Lasix as well as being started
 on ACE inhibitors and beta blockers.  We will need to follow the potassium very carefully.  I did ex
plain to her that sometimes these will improve, but not always 50% chance that she will get improveme
nt with her cardiomyopathy.  She was informed that she should not have any further pregnancies as it 
may become worse the next time she has a pregnancy.  If she does not improve, she may need to undergo
 eventually AICD implant or possible transplant due to the severe cardiomyopathy, ejection fraction i
s estimated at 10-15%.  [place], [place] 
 and she was evicted from her apartment where she was living before since they were unable to 
pay the rent on an adequate time basis.

2.  History of tobacco abuse.  She smoked for many years.  She will be encouraged strongly to stop sm
oking altogether.  At this time, her prognosis is certainly guarded.  We will try aggressive medical 
management to see whether or not we can improve her  cardiomyopathy.

3.  History of anxiety and depression.  This will be dealt with by the primary care physicians.

## 2018-04-09 NOTE — PDOC.PN
- Subjective


Encounter Start Date: 04/09/18


Encounter Start Time: 06:40





Patient seen and examined. No new complaints. No overnight events


pt has less dyspnea, less orthopnea, now on room air





- Objective


Resuscitation Status: 


 











Resuscitation Status           FULL:Full Resuscitation














MAR Reviewed: Yes


Vital Signs & Weight: 


 Vital Signs (12 hours)











  Temp Pulse Resp BP BP Pulse Ox


 


 04/09/18 08:45  97.9 F  92  14  104/75   96


 


 04/09/18 05:34       96


 


 04/09/18 03:56  98.4 F  62  20   140/81  96


 


 04/09/18 03:27  98.0 F  84  25 H   114/77  94 L








 Weight











Weight                         115 lb 15.04 oz














I&O: 


 











 04/08/18 04/09/18 04/10/18





 06:59 06:59 06:59


 


Intake Total 680 240 


 


Output Total 1550 700 


 


Balance -870 -460 











Result Diagrams: 


 04/08/18 05:04





 04/08/18 05:04


EKG Reviewed by me: Yes (nsr)





Phys Exam





- Physical Examination


Constitutional: NAD


HEENT: PERRLA, moist MMs, sclera anicteric


Neck: no JVD, supple


Respiratory: no wheezing, no rales, no rhonchi


Cardiovascular: RRR, no significant murmur, no rub


Gastrointestinal: soft, non-tender, no distention, positive bowel sounds


Musculoskeletal: no edema, pulses present


Neurological: non-focal, normal sensation, moves all 4 limbs


Lymphatic: no nodes


Psychiatric: normal affect, A&O x 3


Skin: no rash, normal turgor





Dx/Plan


(1) Acute systolic heart failure


Code(s): I50.21 - ACUTE SYSTOLIC (CONGESTIVE) HEART FAILURE   Status: Acute   





(2) Demand ischemia of myocardium


Code(s): I24.8 - OTHER FORMS OF ACUTE ISCHEMIC HEART DISEASE   Status: Acute   





(3) Hypokalemia


Code(s): E87.6 - HYPOKALEMIA   Status: Acute   





(4) Postpartum cardiomyopathy


Code(s): O90.3 - PERIPARTUM CARDIOMYOPATHY   Status: Acute   





(5) Anemia, normocytic normochromic


Code(s): D64.9 - ANEMIA, UNSPECIFIED   Status: Chronic   





(6) Anxiety and depression


Code(s): F41.9 - ANXIETY DISORDER, UNSPECIFIED; F32.9 - MAJOR DEPRESSIVE 

DISORDER, SINGLE EPISODE, UNSPECIFIED   Status: Chronic   





(7) Tobacco abuse


Code(s): Z72.0 - TOBACCO USE   Status: Chronic   





(8) Multifocal pneumonia


Code(s): J18.9 - PNEUMONIA, UNSPECIFIED ORGANISM   Status: Suspected   





- Plan


cont current plan of care, plan discussed w/ family, continue antibiotics, 

respiratory therapy





* continue lasix, lisinopril, coreg, aldactone


* as per cardiology, will need life vest


* medication reviewed as below


* symptomatic treatment


* expecting discharge soon


* cardiology following


* cardiac rehab.








Review of Systems





- Review of Systems


Eyes: negative: Pain, Vision Change, Conjunctivae Inflammation, Eyelid 

Inflammation, Redness, Other


ENT: negative: Ear Pain, Ear Discharge, Nose Pain, Nose Discharge, Nose 

Congestion, Mouth Pain, Mouth Swelling, Throat Pain, Throat Swelling, Other


Respiratory: negative: Cough, Dry, Shortness of Breath, Hemoptysis, SOB with 

Excertion, Pleuritic Pain, Sputum, Wheezing


Cardiovascular: negative: chest pain, palpitations, orthopnea, paroxysmal 

nocturnal dyspnea, edema, light headedness, other


Gastrointestinal: negative: Nausea, Vomiting, Abdominal Pain, Diarrhea, 

Constipation, Melena, Hematochezia, Other


Genitourinary: negative: Dysuria, Frequency, Incontinence, Hematuria, Retention

, Other


Musculoskeletal: negative: Neck Pain, Shoulder Pain, Arm Pain, Back Pain, Hand 

Pain, Leg Pain, Foot Pain, Other


Skin: negative: Rash, Lesions, Drew, Bruising, Other





- Medications/Allergies


Allergies/Adverse Reactions: 


 Allergies











Allergy/AdvReac Type Severity Reaction Status Date / Time


 


No Known Drug Allergies Allergy   Verified 04/07/18 09:33











Medications: 


 Current Medications





Acetaminophen (Tylenol)  650 mg PO Q4H PRN


   PRN Reason: Headache/Fever or Pain


   Last Admin: 04/07/18 18:20 Dose:  650 mg


Hydrocodone Bitart/Acetaminophen (Norco 5/325)  1 tab PO Q4H PRN


   PRN Reason: Moderate Pain (4-6)


   Last Admin: 04/07/18 23:13 Dose:  1 tab


Al Hydroxide/Mg Hydroxide (Maalox)  30 ml PO Q6H PRN


   PRN Reason: Heartburn  or Indigestion


Artificial Tears (Tears Naturale)  0 drop EA EYE PRN PRN


   PRN Reason: Dry Eyes


Aspirin (Aspirin Chewable)  81 mg PO DAILY YUN


   Last Admin: 04/09/18 08:53 Dose:  81 mg


Carvedilol (Coreg)  3.125 mg PO BID-[facility]


   Last Admin: 04/09/18 08:52 Dose:  3.125 mg


Enoxaparin Sodium (Lovenox)  40 mg SC 0900 [facility]


   Last Admin: 04/09/18 08:56 Dose:  40 mg


Famotidine (Pepcid)  20 mg PO BID [facility]


   Last Admin: 04/09/18 08:53 Dose:  20 mg


Furosemide (Lasix)  20 mg SLOW IVP 0600,1400 [facility]


   Last Admin: 04/09/18 05:11 Dose:  20 mg


Guaifenesin (Robitussin Sf)  200 mg PO Q4H PRN


   PRN Reason: Cough


Labetalol HCl (Normodyne)  10 mg SLOW IVP Q4H PRN


   PRN Reason: Systolic BP > 180


Lisinopril (Zestril)  2.5 mg PO 2100 [facility]


   Last Admin: 04/08/18 19:49 Dose:  2.5 mg


Loperamide HCl (Imodium)  2 mg PO PRN PRN


   PRN Reason: Diarrhea/Loose Stools


Loratadine (Claritin)  10 mg PO DAILYPRN PRN


   PRN Reason: Sinus Symptoms


Magnesium Hydroxide (Milk Of Magnesium)  30 ml PO DAILYPRN PRN


   PRN Reason: Constipation


Magnesium Oxide (Magnesium Oxide)  400 mg PO DAILY [facility]


   Last Admin: 04/09/18 08:51 Dose:  400 mg


Mineral Oil/White Petrolatum (Eucerin Cream)  0 gm TOP BIDPRN PRN


   PRN Reason: Dry Skin


Nicotine (Nicoderm Patch)  21 mg TOP Q24HR [facility]


   Last Admin: 04/08/18 16:15 Dose:  21 mg


Ondansetron HCl (Zofran Odt)  4 mg PO Q6H PRN


   PRN Reason: Nausea/Vomiting


Ondansetron HCl (Zofran)  4 mg IVP Q6H PRN


   PRN Reason: Nausea/Vomiting


Phenol (Chloraseptic Spray 180 Ml Bot)  0 ml PO PRN PRN


   PRN Reason: Sore Throat


Potassium Chloride (K-Dur)  20 meq PO QAM-[facility]


   Last Admin: 04/09/18 08:51 Dose:  20 meq


Prenatal Multivit/Folic Acid/Iron (Prenatal Vitamin)  1 tab PO DAILY [facility]


   Last Admin: 04/09/18 08:51 Dose:  1 tab


Saccharomyces Boulardii (Florastor)  250 mg PO DAILY [facility]


   Last Admin: 04/09/18 08:52 Dose:  250 mg


Senna (Senokot)  2 tab PO HSPRN PRN


   PRN Reason: Constipation


Sertraline HCl (Zoloft)  50 mg PO DAILY [facility]


   Last Admin: 04/09/18 08:52 Dose:  50 mg


Sodium Chloride (Ocean Nasal Spray 0.65%)  0 ml EA NARE QIDPRN PRN


   PRN Reason: Nasal Congestion


Spironolactone (Aldactone)  25 mg PO QAM-WM [facility]


   Last Admin: 04/09/18 08:51 Dose:  25 mg


Zolpidem Tartrate (Ambien)  5 mg PO HSPRN PRN


   PRN Reason: Insomnia

## 2018-04-09 NOTE — PDOC.CTH
<Michelle Hicks - Last Filed: 04/09/18 13:52>





Cardiology Progress Note





- Subjective





The pt seen and examined.  No overnight events.  No cardiac complaints.  She 

has MITTAL and her HR increases up to 130s with exercise.   





- Objective


 Vital Signs











  Temp Pulse Pulse Pulse Resp BP BP


 


 04/09/18 10:54    123 H  104 H   120/74  115/76


 


 04/09/18 10:40  98.6 F  98    19  


 


 04/09/18 08:45  97.9 F  92    14  


 


 04/09/18 05:34       


 


 04/09/18 03:56  98.4 F  62    20  


 


 04/09/18 03:27  98.0 F  84    25 H  














  BP BP Pulse Ox Pulse Ox Pulse Ox


 


 04/09/18 10:54     99  97


 


 04/09/18 10:40   115/76  99  


 


 04/09/18 08:45  104/75   96  


 


 04/09/18 05:34    96  


 


 04/09/18 03:56   140/81  96  


 


 04/09/18 03:27   114/77  94 L  








 











Weight                         115 lb 15.04 oz














 











 04/08/18 04/09/18 04/10/18





 06:59 06:59 06:59


 


Intake Total 680 240 


 


Output Total 1550 700 


 


Balance -870 -460 














- Physical Examination


General/Neuro: alert & oriented x3


Neck: no JVD present


Lungs: CTA


Heart: RRR


Abdomen: soft


Extremities: other: (No edema)





- Telemetry


Telemetry Rhythm: SR 90-100s





- Labs


Result Diagrams: 


 04/08/18 05:04





 04/08/18 05:04


 Troponin/CKMB











CK-MB (CK-2)  2.2 ng/mL (0-6.6)   04/07/18  04:44    


 


Troponin I  0.029 ng/mL (< 0.028)  H  04/07/18  12:47    














- Assessment/Plan





1. Acute on Chronic Systolic HF - improving with BBlocker, ACE, Lasix, and 

Spironolactone. cont. to monitor  


2. Postpartum CMY - Echo on 04/07/18 showed EF 10-15%, dilated LV, dilated LA, 

mild-mod MR, and mild TR. The pt will d/c with LifeVest.  


3. Tachycardia -Increase Coreg from Coreg 3.125mg to 6.25mg BID; cont. to 

monitor on tele


4. Anemia - cont. to monitor


5. HypoKalemia - managed by PCP


6. PNA - on antibiotics by PCP


7. Current smoker - Smoking cessation education given to the pt


MAR reviewed





* Since she is on ACE, she is not breast feeding to her baby. 














Review of Systems





- Review of Systems


Constitutional: reports: no symptoms reported


EENTM: reports: no symptoms reported


Respiratory: reports: no symptoms reported


Cardiac (ROS): reports: no symptoms reported


ABD/GI: reports: no symptoms reported


: reports: no symptoms reported





<NAYELI Cassidy - Last Filed: 04/09/18 17:47>





Cardiology Progress Note





- Objective


 Vital Signs











  Temp Pulse Pulse Pulse Resp BP BP


 


 04/09/18 15:05  98.4 F  91    21 H  


 


 04/09/18 10:54    123 H  104 H   120/74  115/76


 


 04/09/18 10:40  98.6 F  98    19  


 


 04/09/18 08:45  97.9 F  92    14  














  BP BP Pulse Ox Pulse Ox Pulse Ox


 


 04/09/18 15:05  98/65   99  


 


 04/09/18 10:54     99  97


 


 04/09/18 10:40   115/76  99  


 


 04/09/18 08:45  104/75   96  








 











Weight                         115 lb 15.04 oz














 











 04/08/18 04/09/18 04/10/18





 06:59 06:59 06:59


 


Intake Total 680 240 


 


Output Total 1550 700 


 


Balance -870 -460 














- Labs


Result Diagrams: 


 04/08/18 05:04





 04/08/18 05:04


 Troponin/CKMB











CK-MB (CK-2)  2.2 ng/mL (0-6.6)   04/07/18  04:44    


 


Troponin I  0.029 ng/mL (< 0.028)  H  04/07/18  12:47    














- Assessment/Plan





pt. seen and eval. by me. I agree with the A/P by the NP. She is feeling 

better. The Life-Vest should be placed tomorrow. Continue aggressive medical 

treatment for the CMY.

## 2018-04-09 NOTE — CON
DATE OF CONSULTATION:  2018

 

REASON FOR CONSULTATION:  Pulmonary infiltrates.

 

HISTORY OF PRESENT ILLNESS:  A 32-year-old with history of mitral valve prolapse
, hypertension, and recent normal vaginal delivery on 2018 with 
progressively worsening dyspnea, which exacerbated 2 to 3 days prior to 
admission associated cough with scant sputum production.  No fever, no chest 
pain.  No headaches, no sore throat, odynophagia, dysphagia, no toothache.  No 
abdominal pain or diarrhea.  No genitourinary symptoms.  On arrival, she was 
tachypneic and tachycardic, mildly hypoxic, hypertensive.  Chest x-ray showed 
diffuse pulmonary infiltrates.  CT angio did not show any evidence of pulmonary 
embolism.  She was given broad spectrum antimicrobial coverage.  An 
echocardiogram showed an ejection fraction of 15%, recently with elevated BNP.  
The patient has been managed with IV diuresis and Cardiology has been 
consulted.  They are going to start beta blockers and ACE inhibitors as soon as 
the patient received the baby formula to feed her  baby.  Currently, she 
is much more comfortable, her two kids are in the bed with her.

 

REVIEW OF SYSTEMS:  A 10-point review of systems shows improvement in dyspnea.  
Other areas of the review of systems are negative.

 

PAST MEDICAL HISTORY:  Gestational diabetes, hypertension, and mitral valve 
prolapse.

 

PAST SURGICAL HISTORY:  Negative.

 

SOCIAL HISTORY:  She is .  She is homeless and living in the [place].  
Her  works, but they were behind in payments of their rent they made a 
payment, but the landlord evicted them from their apartment and [place].  She smokes, trying to quit.  No alcoholic beverage or 
illicit drug use.

 

FAMILY HISTORY:  Noncontributory.

 

CURRENT MEDICATIONS:  Include hydrocodone, Maalox, Augmentin, aspirin, Coreg, 
Lovenox, Pepcid, Robitussin, _____, Imodium, Claritin, magnesium, Zofran, 
Chloraseptic, Senokot, Zoloft, lisinopril, furosemide, spironolactone, Ambien.

 

PHYSICAL EXAMINATION:

VITAL SIGNS:  T-max 99, blood pressure 114/87, pulse is 96, respiration 16, O2 
sat 97%.

GENERAL:  There is no distress.

SKIN:  Normal.  Peripheral IV access.  The patient is voiding spontaneously.

HEENT:  Noncontributory.

NECK:  No jugular vein distention.

LUNGS:  With faint basilar crackles.

HEART:  S1, S2, regular rate without S3.

ABDOMEN:  Soft, not distended or tender.

EXTREMITIES:  No joint inflammatory activity.  No edema.  Moves all extremities 
equally.

 

LABORATORY DATA:  White cell count 8.4 and 6.5.  Differential showing fairly 
normal.  Hemoglobin 10.7 and INR was not done.  Creatinine normal.  Liver 
profile normal.  Troponin 0.038.  The last cardiology progress note, acute on 
chronic systolic heart failure, improving with the current management.  Reports 
include an echocardiogram with ejection fraction of 15%, no significant 
valvular abnormality noted.  She has moderate mitral regurgitation, probably 
from the cardiomegaly and CHF.  CT of chest angiogram with no evidence of 
pulmonary embolism, multifocal infiltrates.

 

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 
improvement.  Recommend discontinuation of antimicrobial therapy.

 

MTDD

## 2018-04-10 VITALS — DIASTOLIC BLOOD PRESSURE: 58 MMHG | SYSTOLIC BLOOD PRESSURE: 110 MMHG

## 2018-04-10 VITALS — TEMPERATURE: 97.9 F

## 2018-04-10 RX ADMIN — Medication SCH ML: at 08:27

## 2018-04-10 NOTE — PDOC.CTH
Cardiology Progress Note





- Subjective





The pt seen and examined.  No overnight events.  No cardiac complaints.  





- Objective


 Vital Signs











  Temp Pulse Pulse Pulse Resp BP BP


 


 04/10/18 11:49  97.9 F  101 H    16  


 


 04/10/18 08:42    92  98    118/76


 


 04/10/18 08:26       104/70 


 


 04/10/18 08:00  97.9 F  98    16  


 


 04/10/18 04:00  97.9 F  100    20  














  BP BP BP Pulse Ox Pulse Ox Pulse Ox


 


 04/10/18 11:49    110/58 L  99  


 


 04/10/18 08:42  114/72     98  99


 


 04/10/18 08:26      


 


 04/10/18 08:00    104/70  100  


 


 04/10/18 04:00   100/54 L   96  








 











Weight                         99 lb














 











 04/09/18 04/10/18 04/11/18





 06:59 06:59 06:59


 


Intake Total 240 750 


 


Output Total 700 1000 


 


Balance -460 -250 














- Physical Examination


General/Neuro: alert & oriented x3


Neck: no JVD present


Lungs: CTA


Heart: RRR


Abdomen: soft


Extremities: other: (No edema)





- Telemetry


Telemetry Rhythm: SR and ST 90-100s





- Labs


Result Diagrams: 


 04/08/18 05:04





 04/08/18 05:04


 Troponin/CKMB











CK-MB (CK-2)  2.2 ng/mL (0-6.6)   04/07/18  04:44    


 


Troponin I  0.029 ng/mL (< 0.028)  H  04/07/18  12:47    














- Assessment/Plan





1. Acute on Chronic Systolic HF - improving with BBlocker, ACE, Lasix, and 

Spironolactone. Cont. to monitor  


2. Postpartum CMY - Echo on 04/07/18 showed EF 10-15%, dilated LV, dilated LA, 

mild-mod MR, and mild TR. The pt will d/c with LifeVest.  


3. Tachycardia -stable with Coreg 6.25mg BID; cont. to monitor on tele


4. Anemia - cont. to monitor


5. HypoKalemia - managed by PCP


6. PNA - on antibiotics by PCP


7. Current smoker - Smoking cessation education given to the pt


MAR reviewed





* Since she is on ACE, she is not breast feeding to her baby. 


* From cardiac standpoint, the pt is stable to d/andrei.  Continue aggressive 

medical treatment for the CMY.  The pt hiram f/u with Dr Cassidy' office within 2-4 

wks and Echo within 1 month. 





Review of Systems





- Review of Systems


Constitutional: reports: no symptoms reported


EENTM: reports: no symptoms reported


Respiratory: reports: no symptoms reported


Cardiac (ROS): reports: no symptoms reported


ABD/GI: reports: no symptoms reported


: reports: no symptoms reported


Musculoskeletal: reports: no symptoms reported

## 2018-04-10 NOTE — DIS
DATE OF ADMISSION:  04/07/2018

 

DATE OF DISCHARGE:  04/10/2018

 

PRIMARY CARE PHYSICIAN:  Slava Carmona D.O.

 

DISCHARGE DISPOSITION:  Home.

 

PRIMARY DISCHARGE DIAGNOSES:  New onset acute systolic heart failure, demand ischemia of myocardium, 
hypokalemia, postpartum cardiomyopathy.

 

SECONDARY DISCHARGE DIAGNOSES:  Anxiety, depression, tobacco abuse disorder, and normocytic normochro
david anemia.

 

PRIMARY PROCEDURE/OPERATION:  None.

 

RADIOLOGICAL INVESTIGATION:  Echocardiography showed EF 10%-15%.  CT angiography was negative for pul
monary embolism which was consistent with pulmonary vascular congestion.  Chest x-ray was also consis
tent with pulmonary vascular congestion.

 

SIGNIFICANT LABORATORY DATA:  WBC 6.5, hemoglobin 10.7, platelets 323, D-dimer 0.86.  Sodium 137, pot
assium 3.4, BUN 13, creatinine 0.90.  LFT normal.  Troponin 0.029.  Urinalysis unremarkable.  Blood c
ulture negative.

 

DISCHARGE MEDICATIONS:  Tylenol #3 one or two tablets q.4 hourly p.r.n., Coreg 6.25 mg p.o. b.i.d., L
asix 20 mg p.o. b.i.d., lisinopril 2.5 mg p.o. at bedtime, magnesium oxide 400 mg p.o. daily, prenata
l vitamin 1 tablet p.o. daily, potassium chloride 20 mEq p.o. daily, Zoloft 50 mg p.o. at bedtime, Al
dactone 25 mg p.o. daily.

 

CONTRAINDICATIONS:  None.

 

CODE STATUS:  FULL CODE.

 

INPATIENT CONSULTANT:  Dr. Cassidy was following while in hospital.

 

TEST RESULTS PENDING ON DISCHARGE:  None.

 

ALLERGIES:  No known drug allergy.

 

DISCHARGE PLAN:  Post hospital, patient will follow up with primary care physician.  Patient has appo
intment with cardiac rehab on 04/18/2018 at 8:30 a.m.  The patient has appointment with Dr. Cassidy on 0
4/18/2018 at 2:45 p.m.

 

HOSPITAL COURSE:  A 32-year-old female who had delivery, which was normal vaginal delivery on 03/19/2
018.  Subsequently, patient was slowly experiencing dyspnea on exertion which has progressed to ortho
pnea, PND, and dyspnea even at rest.  She was brought to ER.  At that time, she had routine blood ramón
t done which showed elevated BNP and D-dimer.  That is why CT angio was done which was negative for p
ulmonary embolism, but it was consistent with pulmonary vascular congestion.  Initially, we also susp
ected possible atypical infection in the lungs and that is why in the emergency room, patient also re
ceived Zosyn and vancomycin.  After admission, we continued Zosyn and changed to Augmentin, but we ar
e not suspecting any infection in this particular patient anymore and that is why we discontinued ant
ibiotic therapy.

 

Mainly this patient is having new onset systolic heart failure which was confirmed with echocardiogra
phy, her EF is 10%-15%.  In appropriate clinical scenario, we are thinking this patient has postpartu
m cardiomyopathy with new onset systolic heart failure and we started Coreg, ACE inhibitor, Aldactone
 and Lasix while in hospital.  With that therapy, patient became more clinically stable.  Her dyspnea
 completely improved.  She was not requiring any oxygen and she did not have any orthopnea or leg swe
lling.

 

Given very low EF, Cardiology recommended LifeVest before discharge and that we are arranging today. 
 Patient is currently euvolemic and the patient is instructed to follow with cardiac rehabilitation a
nd Cardiology as an outpatient basis for repeat echocardiography and then AICD versus other procedure
 will be decided based on followup visit.

 

At this point, all new medication prescription sent to her pharmacy.  The patient is seen and examine
d at bedside today.  Currently, the patient is asymptomatic.  She does not have any shortness of anuel
th.  She is on room air.

 

PHYSICAL EXAMINATION:

VITAL SIGNS:  Today, temperature 97.9, pulse 98, respiratory rate 16, saturation 100% on room air, bl
ood pressure 104/70.  Weight 99 pounds.

GENERAL:  The patient is currently alert, awake, no acute distress.

HEAD:  Normocephalic, atraumatic.

EYES:  Pupils round, reactive to light.  Extraocular muscle intact.

ENT:  Oropharynx within normal limits.  Moist mucous membranes.  No oral lesion, no pharyngeal erythe
ma, no exudate.

NECK:  Supple, no JVD, no thyromegaly.

LUNGS:  Clear to auscultation without any rhonchi or rales.

CARDIAC:  S1, S2 regular without any murmur.

ABDOMEN:  Soft and benign.

EXTREMITIES:  No edema.

NEUROLOGIC:  Nonfocal examination.

 

Review of systems reviewed with her and negative.  [facility]

## 2023-01-13 ENCOUNTER — HOSPITAL ENCOUNTER (OUTPATIENT)
Dept: HOSPITAL 92 - CSHSDC | Age: 38
Discharge: HOME | End: 2023-01-13
Attending: OBSTETRICS & GYNECOLOGY
Payer: COMMERCIAL

## 2023-01-13 VITALS — BODY MASS INDEX: 20.1 KG/M2

## 2023-01-13 DIAGNOSIS — N94.6: ICD-10-CM

## 2023-01-13 DIAGNOSIS — N92.0: ICD-10-CM

## 2023-01-13 DIAGNOSIS — I10: ICD-10-CM

## 2023-01-13 DIAGNOSIS — N88.8: ICD-10-CM

## 2023-01-13 DIAGNOSIS — Z79.899: ICD-10-CM

## 2023-01-13 DIAGNOSIS — I42.9: ICD-10-CM

## 2023-01-13 DIAGNOSIS — D25.9: Primary | ICD-10-CM

## 2023-01-13 DIAGNOSIS — N80.329: ICD-10-CM

## 2023-01-13 LAB
BASOPHILS # BLD AUTO: 0 10X3/UL (ref 0–0.2)
BASOPHILS NFR BLD AUTO: 0.2 % (ref 0–2)
EOSINOPHIL # BLD AUTO: 0 10X3/UL (ref 0–0.5)
EOSINOPHIL NFR BLD AUTO: 0.1 % (ref 0–6)
HGB BLD-MCNC: 12.1 G/DL (ref 12–15.5)
LYMPHOCYTES NFR BLD AUTO: 5.7 % (ref 18–47)
MCH RBC QN AUTO: 31.3 PG (ref 27–33)
MCV RBC AUTO: 89.9 FL (ref 81.6–98.3)
MONOCYTES # BLD AUTO: 0.2 10X3/UL (ref 0–1.1)
MONOCYTES NFR BLD AUTO: 1.3 % (ref 0–10)
NEUTROPHILS # BLD AUTO: 13.9 10X3/UL (ref 1.5–8.4)
NEUTROPHILS NFR BLD AUTO: 92.2 % (ref 40–75)
PLATELET # BLD AUTO: 236 10X3/UL (ref 150–450)
RBC # BLD AUTO: 3.87 10X6/UL (ref 3.9–5.03)
WBC # BLD AUTO: 15 10X3/UL (ref 3.5–10.5)

## 2023-01-13 PROCEDURE — 88307 TISSUE EXAM BY PATHOLOGIST: CPT

## 2023-01-13 PROCEDURE — 85025 COMPLETE CBC W/AUTO DIFF WBC: CPT

## 2023-01-13 PROCEDURE — S0020 INJECTION, BUPIVICAINE HYDRO: HCPCS

## 2023-01-13 PROCEDURE — C1776 JOINT DEVICE (IMPLANTABLE): HCPCS

## 2023-01-13 PROCEDURE — 0UT94ZZ RESECTION OF UTERUS, PERCUTANEOUS ENDOSCOPIC APPROACH: ICD-10-PCS | Performed by: OBSTETRICS & GYNECOLOGY

## 2023-01-13 PROCEDURE — 0UT74ZZ RESECTION OF BILATERAL FALLOPIAN TUBES, PERCUTANEOUS ENDOSCOPIC APPROACH: ICD-10-PCS | Performed by: OBSTETRICS & GYNECOLOGY

## 2023-01-13 PROCEDURE — 36415 COLL VENOUS BLD VENIPUNCTURE: CPT

## 2023-01-13 PROCEDURE — 0U5F4ZZ DESTRUCTION OF CUL-DE-SAC, PERCUTANEOUS ENDOSCOPIC APPROACH: ICD-10-PCS | Performed by: OBSTETRICS & GYNECOLOGY
